# Patient Record
Sex: FEMALE | Race: WHITE | Employment: FULL TIME | URBAN - METROPOLITAN AREA
[De-identification: names, ages, dates, MRNs, and addresses within clinical notes are randomized per-mention and may not be internally consistent; named-entity substitution may affect disease eponyms.]

---

## 2021-04-10 ENCOUNTER — HOSPITAL ENCOUNTER (EMERGENCY)
Age: 51
Discharge: HOME | End: 2021-04-10
Attending: EMERGENCY MEDICINE
Payer: MEDICAID

## 2021-04-10 VITALS
DIASTOLIC BLOOD PRESSURE: 62 MMHG | RESPIRATION RATE: 18 BRPM | TEMPERATURE: 97.1 F | OXYGEN SATURATION: 96 % | HEART RATE: 100 BPM | BODY MASS INDEX: 31.55 KG/M2 | WEIGHT: 201 LBS | HEIGHT: 67 IN | SYSTOLIC BLOOD PRESSURE: 115 MMHG

## 2021-04-10 DIAGNOSIS — L03.115 CELLULITIS OF RIGHT LOWER EXTREMITY: Primary | ICD-10-CM

## 2021-04-10 PROCEDURE — 74011250637 HC RX REV CODE- 250/637: Performed by: EMERGENCY MEDICINE

## 2021-04-10 PROCEDURE — 99284 EMERGENCY DEPT VISIT MOD MDM: CPT

## 2021-04-10 RX ORDER — DOXYCYCLINE HYCLATE 100 MG
100 TABLET ORAL 2 TIMES DAILY
Qty: 14 TAB | Refills: 0 | Status: SHIPPED | OUTPATIENT
Start: 2021-04-10 | End: 2021-04-17

## 2021-04-10 RX ORDER — DOXYCYCLINE 100 MG/1
100 CAPSULE ORAL
Status: COMPLETED | OUTPATIENT
Start: 2021-04-10 | End: 2021-04-10

## 2021-04-10 RX ADMIN — DOXYCYCLINE 100 MG: 100 CAPSULE ORAL at 03:13

## 2021-04-10 NOTE — ED PROVIDER NOTES
EMERGENCY DEPARTMENT HISTORY AND PHYSICAL EXAM    Date: 4/10/2021  Patient Name: Francia Wilkins    History of Presenting Illness     Chief Complaint   Patient presents with    Leg Pain         History Provided By: Patient    Frederick Wilkins is a 46 y.o. female with PMHX of hypertension, diabetes who presents to the emergency department C/O insect bite to the right lower leg for the past 2 days with associated redness and swelling. Patient denies any fever, chills, chest pain, shortness of breath, abdominal pain, nausea, vomiting, or any other concerns. Reporting some warmth and tenderness to the area. Denies any trauma or falls. No exacerbating or relieving factors. PCP: None        Past History     Past Medical History:  No past medical history on file. PMHX: Hypertension, diabetes, asthma    Past Surgical History:  No past surgical history on file. Family History:  No family history on file. Noncontributory    Social History:  Social History     Tobacco Use    Smoking status: Not on file   Substance Use Topics    Alcohol use: Not on file    Drug use: Not on file       Allergies:  No Known Allergies      Review of Systems   Review of Systems   Constitutional: Negative for chills and fever. Respiratory: Negative for cough and shortness of breath. Cardiovascular: Negative for chest pain. Gastrointestinal: Negative for abdominal pain, nausea and vomiting. Skin: Positive for wound. All other systems reviewed and are negative.       Physical Exam     Vitals:    04/10/21 0059   BP: 115/62   Pulse: 100   Resp: 18   Temp: 97.1 °F (36.2 °C)   SpO2: 96%   Weight: 91.2 kg (201 lb)   Height: 5' 7\" (1.702 m)     Physical Exam    Nursing notes and vital signs reviewed    Constitutional: Non toxic appearing, no acute distress  Head: Normocephalic, Atraumatic  Eyes: EOMI  Neck: Supple  Cardiovascular: Regular rate and rhythm, no murmurs, rubs, or gallops  Chest: Normal work of breathing and chest excursion bilaterally  Lungs: Clear to ausculation bilaterally  Abdomen: Soft, non tender, non distended, normoactive bowel sounds  Back: No evidence of trauma or deformity  Extremities: No evidence of trauma or deformity, no LE edema  Skin: Warm and dry, normal cap refill. Right lower extremity with superficial cellulitis, warmth and erythema extending above the ankle to lower third of the leg with associated small bite sydnee/lesion. No associated streaking. Marked with pen. Neuro: Alert and appropriate, normal speech, strength and sensation full and symmetric bilaterally, normal gait, normal coordination  Psychiatric: Normal mood and affect      Diagnostic Study Results     Labs -   No results found for this or any previous visit (from the past 12 hour(s)). Radiologic Studies -   No orders to display     CT Results  (Last 48 hours)    None        CXR Results  (Last 48 hours)    None          Medications given in the ED-  Medications   doxycycline (MONODOX) capsule 100 mg (100 mg Oral Given 4/10/21 0313)         Medical Decision Making   I am the first provider for this patient. I reviewed the vital signs, available nursing notes, past medical history, past surgical history, family history and social history. Vital Signs-Reviewed the patient's vital signs. Pulse Oximetry Analysis -96% on room air, not hypoxic     Records Reviewed: Nursing Notes    Provider Notes (Medical Decision Making): Francia Wilkins is a 46 y.o. female with history of hypertension, diabetes presenting status post insect bite to the right lower leg 2 days ago with associated erythema, warmth, swelling and tenderness. On exam she has localized superficial cellulitis with small associated bite sydnee. No associated streaking. We will plan for outpatient antibiotics. Procedures:  Procedures    ED Course:   Area marked with pen.   Patient instructed to monitor size of swelling and to return if any streaking or extension of erythema beyond pen line. Patient discharged with prescription for doxycycline. Given outpatient follow-up and strict return precautions should she experience any fever, chills, worsening erythema, warmth, streaking, or any other concerns. Patient in agreement with discharge plan and strict return precautions. Offering no questions or complaints. SMOKING CESSATION:  The patient was counseled on the dangers of tobacco use, and was advised to quit. Reviewed strategies to maximize success, including removing cigarettes and smoking materials from environment. Discussion took 3-5 minutes, and pt expressed understanding. Diagnosis and Disposition       DISCHARGE NOTE:    Yonatan Baez's  results have been reviewed with her. She has been counseled regarding her diagnosis, treatment, and plan. She verbally conveys understanding and agreement of the signs, symptoms, diagnosis, treatment and prognosis and additionally agrees to follow up as discussed. She also agrees with the care-plan and conveys that all of her questions have been answered. I have also provided discharge instructions for her that include: educational information regarding their diagnosis and treatment, and list of reasons why they would want to return to the ED prior to their follow-up appointment, should her condition change. She has been provided with education for proper emergency department utilization. CLINICAL IMPRESSION:    1. Cellulitis of right lower extremity        PLAN:  1. D/C Home  2. Discharge Medication List as of 4/10/2021  3:05 AM      START taking these medications    Details   doxycycline (VIBRA-TABS) 100 mg tablet Take 1 Tab by mouth two (2) times a day for 7 days. , Normal, Disp-14 Tab, R-0           3.    Follow-up Information     Follow up With Specialties Details Why Contact Info    [facility]  Schedule an appointment as soon as possible for a visit   [id number] [facility], [address] [facility] [address]    [facility] EMERGENCY DE[facility] Emergency Medicine  If symptoms worsen [address] [facility] [id number]  [phone]        _______________________________      Please note that this dictation was completed with [email or web address], the computer voice recognition software. Quite often unanticipated grammatical, syntax, homophones, and other interpretive errors are inadvertently transcribed by the computer software. Please disregard these errors. Please excuse any errors that have escaped final proofreading.

## 2021-04-10 NOTE — DISCHARGE INSTRUCTIONS
Take doxycycline as directed. Keep the area clean and dry. Follow-up outpatient with [facility]. The information has been provided to you. The area with cellulitis has been marked with a marker. Return to the emergency department if there is redness or swelling that extends beyond this marker or if you experience any fever, chills, worsening pain, numbness, tingling, worsening redness, drainage, warmth, or any other concerns.

## 2021-04-10 NOTE — ED TRIAGE NOTES
Pt states got insect bite to right lower leg 2 days ago.  Now with redness and swelling to right lower leg

## 2023-03-24 ENCOUNTER — APPOINTMENT (OUTPATIENT)
Facility: HOSPITAL | Age: 53
End: 2023-03-24
Payer: COMMERCIAL

## 2023-03-24 ENCOUNTER — HOSPITAL ENCOUNTER (EMERGENCY)
Facility: HOSPITAL | Age: 53
Discharge: HOME | End: 2023-03-24
Attending: EMERGENCY MEDICINE
Payer: COMMERCIAL

## 2023-03-24 VITALS
BODY MASS INDEX: 36.1 KG/M2 | TEMPERATURE: 97.7 F | RESPIRATION RATE: 18 BRPM | SYSTOLIC BLOOD PRESSURE: 149 MMHG | HEIGHT: 67 IN | OXYGEN SATURATION: 97 % | HEART RATE: 91 BPM | DIASTOLIC BLOOD PRESSURE: 89 MMHG | WEIGHT: 230 LBS

## 2023-03-24 DIAGNOSIS — M25.511 ACUTE PAIN OF RIGHT SHOULDER: ICD-10-CM

## 2023-03-24 DIAGNOSIS — E83.42 HYPOMAGNESEMIA: ICD-10-CM

## 2023-03-24 DIAGNOSIS — M79.89 LEG SWELLING: Primary | ICD-10-CM

## 2023-03-24 LAB
ALBUMIN SERPL-MCNC: 3.6 G/DL (ref 3.4–5)
ALBUMIN/GLOB SERPL: 1 (ref 0.8–1.7)
ALP SERPL-CCNC: 118 U/L (ref 45–117)
ALT SERPL-CCNC: 20 U/L (ref 13–56)
ANION GAP SERPL CALC-SCNC: 4 MMOL/L (ref 3–18)
AST SERPL-CCNC: 10 U/L (ref 10–38)
BASOPHILS # BLD: 0.1 K/UL (ref 0–0.1)
BASOPHILS NFR BLD: 1 % (ref 0–2)
BILIRUB SERPL-MCNC: 0.4 MG/DL (ref 0.2–1)
BUN SERPL-MCNC: 18 MG/DL (ref 7–18)
BUN/CREAT SERPL: 23 (ref 12–20)
CALCIUM SERPL-MCNC: 9.3 MG/DL (ref 8.5–10.1)
CHLORIDE SERPL-SCNC: 107 MMOL/L (ref 100–111)
CO2 SERPL-SCNC: 30 MMOL/L (ref 21–32)
CREAT SERPL-MCNC: 0.78 MG/DL (ref 0.6–1.3)
DIFFERENTIAL METHOD BLD: ABNORMAL
ECHO BSA: 2.22 M2
EKG ATRIAL RATE: 77 BPM
EKG DIAGNOSIS: NORMAL
EKG P AXIS: 77 DEGREES
EKG P-R INTERVAL: 178 MS
EKG Q-T INTERVAL: 378 MS
EKG QRS DURATION: 98 MS
EKG QTC CALCULATION (BAZETT): 427 MS
EKG R AXIS: -34 DEGREES
EKG T AXIS: 43 DEGREES
EKG VENTRICULAR RATE: 77 BPM
EOSINOPHIL # BLD: 0.4 K/UL (ref 0–0.4)
EOSINOPHIL NFR BLD: 3 % (ref 0–5)
ERYTHROCYTE [DISTWIDTH] IN BLOOD BY AUTOMATED COUNT: 13 % (ref 11.6–14.5)
GLOBULIN SER CALC-MCNC: 3.5 G/DL (ref 2–4)
GLUCOSE BLD STRIP.AUTO-MCNC: 382 MG/DL (ref 70–110)
GLUCOSE SERPL-MCNC: 200 MG/DL (ref 74–99)
HCT VFR BLD AUTO: 40.8 % (ref 35–45)
HGB BLD-MCNC: 13.5 G/DL (ref 12–16)
IMM GRANULOCYTES # BLD AUTO: 0.1 K/UL (ref 0–0.04)
IMM GRANULOCYTES NFR BLD AUTO: 1 % (ref 0–0.5)
LYMPHOCYTES # BLD: 4.4 K/UL (ref 0.9–3.6)
LYMPHOCYTES NFR BLD: 28 % (ref 21–52)
MAGNESIUM SERPL-MCNC: 1.6 MG/DL (ref 1.6–2.6)
MCH RBC QN AUTO: 29.5 PG (ref 24–34)
MCHC RBC AUTO-ENTMCNC: 33.1 G/DL (ref 31–37)
MCV RBC AUTO: 89.1 FL (ref 78–100)
MONOCYTES # BLD: 1.2 K/UL (ref 0.05–1.2)
MONOCYTES NFR BLD: 8 % (ref 3–10)
NEUTS SEG # BLD: 9.3 K/UL (ref 1.8–8)
NEUTS SEG NFR BLD: 60 % (ref 40–73)
NRBC # BLD: 0 K/UL (ref 0–0.01)
NRBC BLD-RTO: 0 PER 100 WBC
PLATELET # BLD AUTO: 186 K/UL (ref 135–420)
PMV BLD AUTO: 12.7 FL (ref 9.2–11.8)
POTASSIUM SERPL-SCNC: 3.7 MMOL/L (ref 3.5–5.5)
PROT SERPL-MCNC: 7.1 G/DL (ref 6.4–8.2)
RBC # BLD AUTO: 4.58 M/UL (ref 4.2–5.3)
SODIUM SERPL-SCNC: 141 MMOL/L (ref 136–145)
WBC # BLD AUTO: 15.6 K/UL (ref 4.6–13.2)

## 2023-03-24 PROCEDURE — 93970 EXTREMITY STUDY: CPT

## 2023-03-24 PROCEDURE — 96375 TX/PRO/DX INJ NEW DRUG ADDON: CPT

## 2023-03-24 PROCEDURE — 99284 EMERGENCY DEPT VISIT MOD MDM: CPT

## 2023-03-24 PROCEDURE — 6360000002 HC RX W HCPCS: Performed by: STUDENT IN AN ORGANIZED HEALTH CARE EDUCATION/TRAINING PROGRAM

## 2023-03-24 PROCEDURE — 80053 COMPREHEN METABOLIC PANEL: CPT

## 2023-03-24 PROCEDURE — 93005 ELECTROCARDIOGRAM TRACING: CPT | Performed by: EMERGENCY MEDICINE

## 2023-03-24 PROCEDURE — 83735 ASSAY OF MAGNESIUM: CPT

## 2023-03-24 PROCEDURE — 85025 COMPLETE CBC W/AUTO DIFF WBC: CPT

## 2023-03-24 PROCEDURE — 82962 GLUCOSE BLOOD TEST: CPT

## 2023-03-24 PROCEDURE — 96365 THER/PROPH/DIAG IV INF INIT: CPT

## 2023-03-24 RX ORDER — MAGNESIUM SULFATE 1 G/100ML
1000 INJECTION INTRAVENOUS
Status: COMPLETED | OUTPATIENT
Start: 2023-03-24 | End: 2023-03-24

## 2023-03-24 RX ORDER — KETOROLAC TROMETHAMINE 15 MG/ML
15 INJECTION, SOLUTION INTRAMUSCULAR; INTRAVENOUS ONCE
Status: COMPLETED | OUTPATIENT
Start: 2023-03-24 | End: 2023-03-24

## 2023-03-24 RX ADMIN — KETOROLAC TROMETHAMINE 15 MG: 15 INJECTION, SOLUTION INTRAMUSCULAR; INTRAVENOUS at 08:11

## 2023-03-24 RX ADMIN — MAGNESIUM SULFATE HEPTAHYDRATE 1000 MG: 1 INJECTION, SOLUTION INTRAVENOUS at 08:11

## 2023-03-24 ASSESSMENT — PAIN - FUNCTIONAL ASSESSMENT: PAIN_FUNCTIONAL_ASSESSMENT: 0-10

## 2023-03-24 ASSESSMENT — PAIN SCALES - GENERAL: PAINLEVEL_OUTOF10: 9

## 2023-03-24 ASSESSMENT — PAIN DESCRIPTION - LOCATION: LOCATION: ARM

## 2023-03-24 ASSESSMENT — PAIN DESCRIPTION - ORIENTATION: ORIENTATION: RIGHT

## 2023-03-24 ASSESSMENT — PAIN DESCRIPTION - DESCRIPTORS: DESCRIPTORS: ACHING

## 2023-03-24 NOTE — ED PROVIDER NOTES
Strength 5 out of 5 lower extremity and upper extremity bilateral and sensation intact. Patient is having notable stiffness with raising her right arm but less discomfort and moving her right leg. In fact right leg is moving normally. Patient's legs appear equal, no recent risk factors for DVT, I do not notice any unilateral swelling or edema. Plan is to obtain ultrasound venous doppler to rule out DVT for her R lower leg pain/swelling. I do not suspect fracture as a cause, infection, ischemic limb, stroke, septic arthritis, CHF. Will get screening CBC, renal function, liver function. Will give Toradol for pain. For her right shoulder, no trauma, do not suspect septic arthritis, fracture. We discussed imaging but she declined giving low likelihood of fracture. Plan will be trying Toradol for that and discussing discharge plan with NSAIDs and primary follow-up. The decision to perform testing and results were discussed with the patient. I discussed each of these tests and considerations with the patient. They agree with the plan of discharge. Re-eval  See hospital course. Patient's U/S was negative for DVT. Labs were unremarkable save for high glucose which is expected given she has not taken her insulin yet today, but she elects to take it at home. Plan is outpatient follow up for R shoulder pain and R leg pain. Dr. Perez Pinon, DO    Diagnosis and Disposition     DISPOSITION:  DISPOSITION Decision To Discharge 03/24/2023 11:25:19 AM      DISCHARGE NOTE:  Jessenia Trotter's  results have been reviewed with her. She has been counseled regarding her diagnosis, treatment, and plan. She verbally conveys understanding and agreement of the signs, symptoms, diagnosis, treatment and prognosis and additionally agrees to follow up as discussed. She also agrees with the care-plan and conveys that all of her questions have been answered.   I have also provided discharge instructions for her that include:

## 2023-03-24 NOTE — ED TRIAGE NOTES
Pt CC of:R arm and leg swelling   Time of onset:yesterday   Activity of onset:working   Description of pain:aching   Treatment PTA:aleve @2100  Associated sx: denies N/V/D/C   in triage     Pt ambulated independently  Speaking in full sentences clearly  A&Ox4  Respirations even and unlabored  Pt behavior: Sitting comfortably on  chair

## 2023-03-24 NOTE — ED NOTES
Pt states that she has been having right-sided pain since Tuesday.       Rafi Chahal RN  03/24/23 0630

## 2023-03-27 LAB
EKG ATRIAL RATE: 77 BPM
EKG DIAGNOSIS: NORMAL
EKG P AXIS: 77 DEGREES
EKG P-R INTERVAL: 178 MS
EKG Q-T INTERVAL: 378 MS
EKG QRS DURATION: 98 MS
EKG QTC CALCULATION (BAZETT): 427 MS
EKG R AXIS: -34 DEGREES
EKG T AXIS: 43 DEGREES
EKG VENTRICULAR RATE: 77 BPM

## 2023-12-11 ENCOUNTER — HOSPITAL ENCOUNTER (EMERGENCY)
Facility: HOSPITAL | Age: 53
Discharge: HOME | End: 2023-12-11
Attending: EMERGENCY MEDICINE
Payer: COMMERCIAL

## 2023-12-11 ENCOUNTER — APPOINTMENT (OUTPATIENT)
Facility: HOSPITAL | Age: 53
End: 2023-12-11
Payer: COMMERCIAL

## 2023-12-11 VITALS
HEART RATE: 99 BPM | OXYGEN SATURATION: 100 % | BODY MASS INDEX: 37.2 KG/M2 | TEMPERATURE: 98 F | HEIGHT: 67 IN | DIASTOLIC BLOOD PRESSURE: 67 MMHG | SYSTOLIC BLOOD PRESSURE: 131 MMHG | RESPIRATION RATE: 18 BRPM | WEIGHT: 237 LBS

## 2023-12-11 DIAGNOSIS — M54.2 NECK PAIN: Primary | ICD-10-CM

## 2023-12-11 DIAGNOSIS — M19.019 SHOULDER ARTHRITIS: ICD-10-CM

## 2023-12-11 DIAGNOSIS — M06.9 RHEUMATOID ARTHRITIS, INVOLVING UNSPECIFIED SITE, UNSPECIFIED WHETHER RHEUMATOID FACTOR PRESENT (HCC): ICD-10-CM

## 2023-12-11 LAB
ANION GAP SERPL CALC-SCNC: 5 MMOL/L (ref 3–18)
BASOPHILS # BLD: 0.1 K/UL (ref 0–0.1)
BASOPHILS NFR BLD: 1 % (ref 0–2)
BUN SERPL-MCNC: 9 MG/DL (ref 7–18)
BUN/CREAT SERPL: 17 (ref 12–20)
CALCIUM SERPL-MCNC: 9.4 MG/DL (ref 8.5–10.1)
CHLORIDE SERPL-SCNC: 104 MMOL/L (ref 100–111)
CHP ED QC CHECK: NORMAL
CK SERPL-CCNC: 66 U/L (ref 26–192)
CO2 SERPL-SCNC: 27 MMOL/L (ref 21–32)
CREAT SERPL-MCNC: 0.52 MG/DL (ref 0.6–1.3)
DIFFERENTIAL METHOD BLD: ABNORMAL
EOSINOPHIL # BLD: 0.2 K/UL (ref 0–0.4)
EOSINOPHIL NFR BLD: 1 % (ref 0–5)
ERYTHROCYTE [DISTWIDTH] IN BLOOD BY AUTOMATED COUNT: 13.8 % (ref 11.6–14.5)
GLUCOSE BLD STRIP.AUTO-MCNC: 226 MG/DL (ref 70–110)
GLUCOSE BLD STRIP.AUTO-MCNC: 317 MG/DL (ref 70–110)
GLUCOSE SERPL-MCNC: 237 MG/DL (ref 74–99)
HCT VFR BLD AUTO: 44.1 % (ref 35–45)
HGB BLD-MCNC: 14.8 G/DL (ref 12–16)
IMM GRANULOCYTES # BLD AUTO: 0.1 K/UL (ref 0–0.04)
IMM GRANULOCYTES NFR BLD AUTO: 1 % (ref 0–0.5)
LYMPHOCYTES # BLD: 5 K/UL (ref 0.9–3.6)
LYMPHOCYTES NFR BLD: 34 % (ref 21–52)
MAGNESIUM SERPL-MCNC: 1.6 MG/DL (ref 1.6–2.6)
MCH RBC QN AUTO: 31.2 PG (ref 24–34)
MCHC RBC AUTO-ENTMCNC: 33.6 G/DL (ref 31–37)
MCV RBC AUTO: 92.8 FL (ref 78–100)
MONOCYTES # BLD: 1.2 K/UL (ref 0.05–1.2)
MONOCYTES NFR BLD: 8 % (ref 3–10)
NEUTS SEG # BLD: 8.2 K/UL (ref 1.8–8)
NEUTS SEG NFR BLD: 55 % (ref 40–73)
NRBC # BLD: 0 K/UL (ref 0–0.01)
NRBC BLD-RTO: 0 PER 100 WBC
PLATELET # BLD AUTO: 207 K/UL (ref 135–420)
PMV BLD AUTO: 11.8 FL (ref 9.2–11.8)
POTASSIUM SERPL-SCNC: 4.3 MMOL/L (ref 3.5–5.5)
RBC # BLD AUTO: 4.75 M/UL (ref 4.2–5.3)
SODIUM SERPL-SCNC: 136 MMOL/L (ref 136–145)
TROPONIN I SERPL HS-MCNC: 4 NG/L (ref 0–54)
WBC # BLD AUTO: 14.8 K/UL (ref 4.6–13.2)

## 2023-12-11 PROCEDURE — 85025 COMPLETE CBC W/AUTO DIFF WBC: CPT

## 2023-12-11 PROCEDURE — 6360000002 HC RX W HCPCS: Performed by: EMERGENCY MEDICINE

## 2023-12-11 PROCEDURE — 83735 ASSAY OF MAGNESIUM: CPT

## 2023-12-11 PROCEDURE — 99284 EMERGENCY DEPT VISIT MOD MDM: CPT

## 2023-12-11 PROCEDURE — 96375 TX/PRO/DX INJ NEW DRUG ADDON: CPT

## 2023-12-11 PROCEDURE — 84484 ASSAY OF TROPONIN QUANT: CPT

## 2023-12-11 PROCEDURE — 82550 ASSAY OF CK (CPK): CPT

## 2023-12-11 PROCEDURE — 96374 THER/PROPH/DIAG INJ IV PUSH: CPT

## 2023-12-11 PROCEDURE — 82962 GLUCOSE BLOOD TEST: CPT

## 2023-12-11 PROCEDURE — 80048 BASIC METABOLIC PNL TOTAL CA: CPT

## 2023-12-11 PROCEDURE — 6370000000 HC RX 637 (ALT 250 FOR IP): Performed by: EMERGENCY MEDICINE

## 2023-12-11 PROCEDURE — 73030 X-RAY EXAM OF SHOULDER: CPT

## 2023-12-11 RX ORDER — HYDROMORPHONE HYDROCHLORIDE 1 MG/ML
1 INJECTION, SOLUTION INTRAMUSCULAR; INTRAVENOUS; SUBCUTANEOUS ONCE
Status: COMPLETED | OUTPATIENT
Start: 2023-12-11 | End: 2023-12-11

## 2023-12-11 RX ORDER — METHOCARBAMOL 500 MG/1
1500 TABLET, FILM COATED ORAL
Status: COMPLETED | OUTPATIENT
Start: 2023-12-11 | End: 2023-12-11

## 2023-12-11 RX ORDER — KETOROLAC TROMETHAMINE 15 MG/ML
30 INJECTION, SOLUTION INTRAMUSCULAR; INTRAVENOUS
Status: COMPLETED | OUTPATIENT
Start: 2023-12-11 | End: 2023-12-11

## 2023-12-11 RX ORDER — PREDNISOLONE SODIUM PHOSPHATE 15 MG/5ML
30 SOLUTION ORAL DAILY
Qty: 50 ML | Refills: 0 | Status: SHIPPED | OUTPATIENT
Start: 2023-12-11 | End: 2023-12-16

## 2023-12-11 RX ADMIN — METHOCARBAMOL 1500 MG: 500 TABLET ORAL at 18:11

## 2023-12-11 RX ADMIN — KETOROLAC TROMETHAMINE 30 MG: 15 INJECTION, SOLUTION INTRAMUSCULAR; INTRAVENOUS at 18:10

## 2023-12-11 RX ADMIN — HYDROMORPHONE HYDROCHLORIDE 1 MG: 1 INJECTION, SOLUTION INTRAMUSCULAR; INTRAVENOUS; SUBCUTANEOUS at 18:10

## 2023-12-11 ASSESSMENT — PAIN SCALES - GENERAL: PAINLEVEL_OUTOF10: 10

## 2023-12-11 ASSESSMENT — PAIN DESCRIPTION - LOCATION: LOCATION: NECK;ARM

## 2023-12-11 ASSESSMENT — PAIN DESCRIPTION - ORIENTATION: ORIENTATION: RIGHT

## 2023-12-11 ASSESSMENT — PAIN - FUNCTIONAL ASSESSMENT: PAIN_FUNCTIONAL_ASSESSMENT: 0-10

## 2023-12-11 NOTE — ED TRIAGE NOTES
Pt ambulatory to triage c/o pain from right side of neck down to underarm, swelling around lower part of neck, HA, and CP on the right side that started while in waiting room. Swelling and pain started Friday. Denies any injury. Hx RA, DM. Taking tramadol.

## 2023-12-14 PROBLEM — E78.5 HYPERLIPIDEMIA LDL GOAL <100: Status: ACTIVE | Noted: 2023-04-17

## 2023-12-14 PROBLEM — F17.200 CURRENT SMOKER: Status: ACTIVE | Noted: 2023-12-14

## 2023-12-14 PROBLEM — J45.909 ASTHMA: Status: ACTIVE | Noted: 2023-12-14

## 2023-12-14 PROBLEM — M43.17 SPONDYLOLISTHESIS OF LUMBOSACRAL REGION: Status: ACTIVE | Noted: 2023-08-01

## 2023-12-14 PROBLEM — E66.9 OBESITY WITH BODY MASS INDEX 30 OR GREATER: Status: ACTIVE | Noted: 2023-12-14

## 2023-12-14 PROBLEM — E11.9 DIABETES MELLITUS (HCC): Status: ACTIVE | Noted: 2023-12-14

## 2023-12-14 PROBLEM — E55.9 VITAMIN D DEFICIENCY: Status: ACTIVE | Noted: 2023-04-17

## 2023-12-14 PROBLEM — I10 HYPERTENSION: Status: ACTIVE | Noted: 2023-12-14

## 2023-12-14 PROBLEM — M19.90 ARTHRITIS: Status: ACTIVE | Noted: 2023-12-14

## 2023-12-14 PROBLEM — M79.641 BILATERAL HAND PAIN: Status: ACTIVE | Noted: 2023-04-17

## 2023-12-14 PROBLEM — M54.50 CHRONIC BILATERAL LOW BACK PAIN WITHOUT SCIATICA: Status: ACTIVE | Noted: 2023-04-17

## 2023-12-14 PROBLEM — M79.642 BILATERAL HAND PAIN: Status: ACTIVE | Noted: 2023-04-17

## 2023-12-14 PROBLEM — G89.29 CHRONIC BILATERAL LOW BACK PAIN WITHOUT SCIATICA: Status: ACTIVE | Noted: 2023-04-17

## 2023-12-14 NOTE — ED PROVIDER NOTES
EMERGENCY DEPARTMENT HISTORY AND PHYSICAL EXAM    Date: 12/11/2023  Patient Name: Maria Del Carmen Claudio    History of Presenting Illness     Chief Complaint   Patient presents with    Arm Pain    Swelling lower neck    Headache    Chest Pain    Nausea         History Provided By: Patient    Additional History (Context):   5:28 PM VU Claudio is a 48 y.o. female with PMHX of asthma, nicotine addiction, hypertension, hyperlipidemia, diabetes, lumbar sacral spondylolisthesis, obesity, vitamin D deficiency who presents to the emergency department C/O neck pain. Patient states she has pain taken place in the right side of her neck. Pain goes to the chest as well as axillary region. Patient has complaints of myalgias or arthralgias to all joints is a frequent, long-term problem. She also has some headache. She states the swelling started on Friday. There is no cough or shortness of breath. No fevers or chills. No numbness or dropping of objects. Patient adds after she is checking him that she is being treated for positive tuberculosis test after being exposed by her brother was 35 years ago. Review of record confirms showing diagnosis of rheumatoid arthritis as well as her brother having active TB 35 years ago and that the patient is \"allergic to\" PPD examinations. Notation show 52 potential initiation of Biologics, QuantiFERON gold was obtained x 2 that was positive. Infectious disease determines that she is currently in latent state not contagious with a 5% risk of converting to active contagious TB over her lifetime. Biologic therapy for her rheumatoid is contraindicated because this would increase her risk from 5% to 40%. Instead she is receiving INH and B6 for the next 9 months. Patient reports she has not started these medications yet.       Social History  No smoking drinking or drugs    Family History  Younger brother with tuberculosis    PCP: CANDELARIA Diallo - NP    No current

## 2024-01-21 ENCOUNTER — HOSPITAL ENCOUNTER (EMERGENCY)
Facility: HOSPITAL | Age: 54
Discharge: HOME | End: 2024-01-22
Attending: EMERGENCY MEDICINE
Payer: COMMERCIAL

## 2024-01-21 DIAGNOSIS — E11.65 HYPERGLYCEMIA DUE TO DIABETES MELLITUS (HCC): Primary | ICD-10-CM

## 2024-01-21 DIAGNOSIS — Z92.241 HISTORY OF RECENT STEROID USE: ICD-10-CM

## 2024-01-21 DIAGNOSIS — M05.9 SEROPOSITIVE RHEUMATOID ARTHRITIS (HCC): ICD-10-CM

## 2024-01-21 PROCEDURE — 99284 EMERGENCY DEPT VISIT MOD MDM: CPT

## 2024-01-21 ASSESSMENT — PAIN - FUNCTIONAL ASSESSMENT: PAIN_FUNCTIONAL_ASSESSMENT: NONE - DENIES PAIN

## 2024-01-22 ENCOUNTER — APPOINTMENT (OUTPATIENT)
Facility: HOSPITAL | Age: 54
End: 2024-01-22
Payer: COMMERCIAL

## 2024-01-22 VITALS
SYSTOLIC BLOOD PRESSURE: 121 MMHG | WEIGHT: 227 LBS | DIASTOLIC BLOOD PRESSURE: 67 MMHG | BODY MASS INDEX: 35.63 KG/M2 | HEART RATE: 94 BPM | OXYGEN SATURATION: 98 % | RESPIRATION RATE: 20 BRPM | HEIGHT: 67 IN | TEMPERATURE: 97 F

## 2024-01-22 LAB
ALBUMIN SERPL-MCNC: 3.7 G/DL (ref 3.4–5)
ALBUMIN/GLOB SERPL: 1.1 (ref 0.8–1.7)
ALP SERPL-CCNC: 144 U/L (ref 45–117)
ALT SERPL-CCNC: 37 U/L (ref 13–56)
ANION GAP SERPL CALC-SCNC: 7 MMOL/L (ref 3–18)
AST SERPL-CCNC: 17 U/L (ref 10–38)
BASOPHILS # BLD: 0.1 K/UL (ref 0–0.1)
BASOPHILS NFR BLD: 1 % (ref 0–2)
BILIRUB SERPL-MCNC: 0.6 MG/DL (ref 0.2–1)
BUN SERPL-MCNC: 7 MG/DL (ref 7–18)
BUN/CREAT SERPL: 9 (ref 12–20)
CALCIUM SERPL-MCNC: 9.1 MG/DL (ref 8.5–10.1)
CHLORIDE SERPL-SCNC: 101 MMOL/L (ref 100–111)
CO2 SERPL-SCNC: 25 MMOL/L (ref 21–32)
CREAT SERPL-MCNC: 0.81 MG/DL (ref 0.6–1.3)
DIFFERENTIAL METHOD BLD: ABNORMAL
EOSINOPHIL # BLD: 0.2 K/UL (ref 0–0.4)
EOSINOPHIL NFR BLD: 1 % (ref 0–5)
ERYTHROCYTE [DISTWIDTH] IN BLOOD BY AUTOMATED COUNT: 13.6 % (ref 11.6–14.5)
EST. AVERAGE GLUCOSE BLD GHB EST-MCNC: 286 MG/DL
GLOBULIN SER CALC-MCNC: 3.3 G/DL (ref 2–4)
GLUCOSE BLD STRIP.AUTO-MCNC: 267 MG/DL (ref 70–110)
GLUCOSE BLD STRIP.AUTO-MCNC: 524 MG/DL (ref 70–110)
GLUCOSE SERPL-MCNC: 522 MG/DL (ref 74–99)
HBA1C MFR BLD: 11.6 % (ref 4.2–5.6)
HCT VFR BLD AUTO: 44 % (ref 35–45)
HGB BLD-MCNC: 14.8 G/DL (ref 12–16)
IMM GRANULOCYTES # BLD AUTO: 0.1 K/UL (ref 0–0.04)
IMM GRANULOCYTES NFR BLD AUTO: 1 % (ref 0–0.5)
LYMPHOCYTES # BLD: 4 K/UL (ref 0.9–3.6)
LYMPHOCYTES NFR BLD: 34 % (ref 21–52)
MCH RBC QN AUTO: 30.5 PG (ref 24–34)
MCHC RBC AUTO-ENTMCNC: 33.6 G/DL (ref 31–37)
MCV RBC AUTO: 90.7 FL (ref 78–100)
MONOCYTES # BLD: 1.1 K/UL (ref 0.05–1.2)
MONOCYTES NFR BLD: 9 % (ref 3–10)
NEUTS SEG # BLD: 6.4 K/UL (ref 1.8–8)
NEUTS SEG NFR BLD: 54 % (ref 40–73)
NRBC # BLD: 0 K/UL (ref 0–0.01)
NRBC BLD-RTO: 0 PER 100 WBC
PLATELET # BLD AUTO: 172 K/UL (ref 135–420)
PMV BLD AUTO: 12.8 FL (ref 9.2–11.8)
POTASSIUM SERPL-SCNC: 3.2 MMOL/L (ref 3.5–5.5)
PROT SERPL-MCNC: 7 G/DL (ref 6.4–8.2)
RBC # BLD AUTO: 4.85 M/UL (ref 4.2–5.3)
SODIUM SERPL-SCNC: 133 MMOL/L (ref 136–145)
TROPONIN I SERPL HS-MCNC: 5 NG/L (ref 0–54)
WBC # BLD AUTO: 11.8 K/UL (ref 4.6–13.2)

## 2024-01-22 PROCEDURE — 71045 X-RAY EXAM CHEST 1 VIEW: CPT

## 2024-01-22 PROCEDURE — 82962 GLUCOSE BLOOD TEST: CPT

## 2024-01-22 PROCEDURE — 96374 THER/PROPH/DIAG INJ IV PUSH: CPT

## 2024-01-22 PROCEDURE — 2580000003 HC RX 258: Performed by: EMERGENCY MEDICINE

## 2024-01-22 PROCEDURE — 83036 HEMOGLOBIN GLYCOSYLATED A1C: CPT

## 2024-01-22 PROCEDURE — 80053 COMPREHEN METABOLIC PANEL: CPT

## 2024-01-22 PROCEDURE — 6370000000 HC RX 637 (ALT 250 FOR IP): Performed by: EMERGENCY MEDICINE

## 2024-01-22 PROCEDURE — 85025 COMPLETE CBC W/AUTO DIFF WBC: CPT

## 2024-01-22 PROCEDURE — 96361 HYDRATE IV INFUSION ADD-ON: CPT

## 2024-01-22 PROCEDURE — 84484 ASSAY OF TROPONIN QUANT: CPT

## 2024-01-22 RX ORDER — PREDNISONE 5 MG/1
5 TABLET ORAL DAILY
COMMUNITY
Start: 2023-12-19

## 2024-01-22 RX ORDER — ERGOCALCIFEROL 1.25 MG/1
50000 CAPSULE ORAL WEEKLY
COMMUNITY

## 2024-01-22 RX ORDER — 0.9 % SODIUM CHLORIDE 0.9 %
500 INTRAVENOUS SOLUTION INTRAVENOUS ONCE
Status: COMPLETED | OUTPATIENT
Start: 2024-01-22 | End: 2024-01-22

## 2024-01-22 RX ORDER — FOLIC ACID 1 MG/1
1000 TABLET ORAL DAILY
COMMUNITY

## 2024-01-22 RX ORDER — TRAMADOL HYDROCHLORIDE 50 MG/1
50 TABLET ORAL EVERY 6 HOURS PRN
COMMUNITY

## 2024-01-22 RX ORDER — GABAPENTIN 300 MG/1
300 CAPSULE ORAL 3 TIMES DAILY
COMMUNITY
Start: 2023-12-26 | End: 2024-06-23

## 2024-01-22 RX ORDER — ISONIAZID 300 MG/1
300 TABLET ORAL DAILY
COMMUNITY

## 2024-01-22 RX ORDER — LOSARTAN POTASSIUM 25 MG/1
25 TABLET ORAL DAILY
COMMUNITY
Start: 2023-12-26

## 2024-01-22 RX ORDER — INSULIN DETEMIR 100 [IU]/ML
22 INJECTION, SOLUTION SUBCUTANEOUS 2 TIMES DAILY
COMMUNITY
Start: 2023-12-26

## 2024-01-22 RX ORDER — 0.9 % SODIUM CHLORIDE 0.9 %
1000 INTRAVENOUS SOLUTION INTRAVENOUS ONCE
Status: COMPLETED | OUTPATIENT
Start: 2024-01-22 | End: 2024-01-22

## 2024-01-22 RX ORDER — MELOXICAM 15 MG/1
15 TABLET ORAL DAILY
COMMUNITY
Start: 2023-12-26

## 2024-01-22 RX ORDER — TIZANIDINE 2 MG/1
2 TABLET ORAL EVERY 6 HOURS PRN
COMMUNITY

## 2024-01-22 RX ORDER — PYRIDOXINE HCL (VITAMIN B6) 50 MG
50 TABLET ORAL DAILY
COMMUNITY
Start: 2023-12-18 | End: 2024-09-13

## 2024-01-22 RX ORDER — HYDROXYCHLOROQUINE SULFATE 200 MG/1
400 TABLET, FILM COATED ORAL DAILY
COMMUNITY

## 2024-01-22 RX ORDER — ATORVASTATIN CALCIUM 20 MG/1
20 TABLET, FILM COATED ORAL DAILY
COMMUNITY
Start: 2023-12-26

## 2024-01-22 RX ADMIN — SODIUM CHLORIDE 500 ML: 9 INJECTION, SOLUTION INTRAVENOUS at 01:40

## 2024-01-22 RX ADMIN — SODIUM CHLORIDE 1000 ML: 9 INJECTION, SOLUTION INTRAVENOUS at 01:41

## 2024-01-22 RX ADMIN — INSULIN HUMAN 10 UNITS: 100 INJECTION, SOLUTION PARENTERAL at 01:42

## 2024-01-22 ASSESSMENT — PAIN - FUNCTIONAL ASSESSMENT: PAIN_FUNCTIONAL_ASSESSMENT: NONE - DENIES PAIN

## 2024-01-22 ASSESSMENT — LIFESTYLE VARIABLES
HOW OFTEN DO YOU HAVE A DRINK CONTAINING ALCOHOL: NEVER
HOW MANY STANDARD DRINKS CONTAINING ALCOHOL DO YOU HAVE ON A TYPICAL DAY: PATIENT DOES NOT DRINK

## 2024-01-22 NOTE — ED NOTES
Pt in bed with head elevated.  Pt is resting comfortably and without complaint at this time. Bed in lowest position with rails up.  Call light within reach.  Pt awaiting lab results and MD dispo.

## 2024-01-22 NOTE — ED TRIAGE NOTES
Pt presents to Ed for c/o high blood sugar, states her reader at home is reading \"HIGH\" today.    +dizziness  +slurred speech  +blurry vision  Weakness/fatigue    Hx DM II, liver dz, RA    Took 22 units insulin at 1900,  Pt AAOx4 NAD

## 2024-01-22 NOTE — ED PROVIDER NOTES
with the care-plan and conveys that all of her questions have been answered.  I have also provided discharge instructions for her that include: educational information regarding their diagnosis and treatment, and list of reasons why they would want to return to the ED prior to their follow-up appointment, should her condition change. She has been provided with education for proper emergency department utilization.     CLINICAL IMPRESSION:    No diagnosis found.    PLAN:  1. D/C Home  2.      Medication List        ASK your doctor about these medications      atorvastatin 20 MG tablet  Commonly known as: LIPITOR     folic acid 1 MG tablet  Commonly known as: FOLVITE     gabapentin 300 MG capsule  Commonly known as: NEURONTIN     hydroxychloroquine 200 MG tablet  Commonly known as: PLAQUENIL     isoniazid 300 MG tablet  Commonly known as: NYDRAZID     Levemir FlexPen 100 UNIT/ML injection pen  Generic drug: insulin detemir     losartan 25 MG tablet  Commonly known as: COZAAR     meloxicam 15 MG tablet  Commonly known as: MOBIC     metFORMIN 1000 MG tablet  Commonly known as: GLUCOPHAGE     predniSONE 5 MG tablet  Commonly known as: DELTASONE     pyridoxine 50 MG tablet  Commonly known as: B-6     tiZANidine 2 MG tablet  Commonly known as: ZANAFLEX     traMADol 50 MG tablet  Commonly known as: ULTRAM     vitamin D 1.25 MG (12350 UT) Caps capsule  Commonly known as: ERGOCALCIFEROL            3. [unfilled]  _______________________________    This note was partially transcribed via voice recognition software. Although efforts have been made to catch any discrepancies, it may contain sound alike words, grammatical errors, or nonsensical words.          providing this level of medical care for this critically ill patient was due a critical illness that impaired one or more vital organ systems such that there was a high probability of imminent or life threatening deterioration in the patients condition. This care involved high complexity decision making to assess, manipulate, and support vital system functions, to treat this degreee vital organ system failure and to prevent further life threatening deterioration of the patient’s condition.         Diagnosis and Disposition       DISCHARGE NOTE:  3:46 AM  Sarah Trotter's  results have been reviewed with her.  She has been counseled regarding her diagnosis, treatment, and plan.  She verbally conveys understanding and agreement of the signs, symptoms, diagnosis, treatment and prognosis and additionally agrees to follow up as discussed.  She also agrees with the care-plan and conveys that all of her questions have been answered.  I have also provided discharge instructions for her that include: educational information regarding their diagnosis and treatment, and list of reasons why they would want to return to the ED prior to their follow-up appointment, should her condition change. She has been provided with education for proper emergency department utilization.     CLINICAL IMPRESSION:    No diagnosis found.    PLAN:  1. D/C Home  2.      Medication List        ASK your doctor about these medications      atorvastatin 20 MG tablet  Commonly known as: LIPITOR     folic acid 1 MG tablet  Commonly known as: FOLVITE     gabapentin 300 MG capsule  Commonly known as: NEURONTIN     hydroxychloroquine 200 MG tablet  Commonly known as: PLAQUENIL     isoniazid 300 MG tablet  Commonly known as: NYDRAZID     Levemir FlexPen 100 UNIT/ML injection pen  Generic drug: insulin detemir     losartan 25 MG tablet  Commonly known as: COZAAR     meloxicam 15 MG tablet  Commonly known as: MOBIC     metFORMIN 1000 MG tablet  Commonly known  as: GLUCOPHAGE     predniSONE 5 MG tablet  Commonly known as: DELTASONE     pyridoxine 50 MG tablet  Commonly known as: B-6     tiZANidine 2 MG tablet  Commonly known as: ZANAFLEX     traMADol 50 MG tablet  Commonly known as: ULTRAM     vitamin D 1.25 MG (95180 UT) Caps capsule  Commonly known as: ERGOCALCIFEROL            3. [unfilled]  _______________________________    This note was partially transcribed via voice recognition software. Although efforts have been made to catch any discrepancies, it may contain sound alike words, grammatical errors, or nonsensical words.            Javier Allen MD  02/09/24 9433

## 2024-01-22 NOTE — ED NOTES
Pt d/c with instructions given.  Pt verbalized understanding.  Pt d/c to waiting room and is ambulatory without assistance and into awaiting car.

## 2024-02-09 PROBLEM — Z22.7 TB LUNG, LATENT: Status: ACTIVE | Noted: 2023-12-06
